# Patient Record
Sex: MALE | Race: WHITE | NOT HISPANIC OR LATINO | Employment: UNEMPLOYED | URBAN - METROPOLITAN AREA
[De-identification: names, ages, dates, MRNs, and addresses within clinical notes are randomized per-mention and may not be internally consistent; named-entity substitution may affect disease eponyms.]

---

## 2018-08-23 ENCOUNTER — HOSPITAL ENCOUNTER (EMERGENCY)
Facility: HOSPITAL | Age: 33
Discharge: HOME | End: 2018-08-24
Attending: EMERGENCY MEDICINE

## 2018-08-23 VITALS
WEIGHT: 220 LBS | SYSTOLIC BLOOD PRESSURE: 124 MMHG | HEIGHT: 75 IN | DIASTOLIC BLOOD PRESSURE: 83 MMHG | RESPIRATION RATE: 20 BRPM | BODY MASS INDEX: 27.35 KG/M2 | TEMPERATURE: 98 F | OXYGEN SATURATION: 95 % | HEART RATE: 99 BPM

## 2018-08-23 DIAGNOSIS — H10.9 CONJUNCTIVITIS OF BOTH EYES, UNSPECIFIED CONJUNCTIVITIS TYPE: Primary | ICD-10-CM

## 2018-08-23 PROCEDURE — 99283 EMERGENCY DEPT VISIT LOW MDM: CPT

## 2018-08-24 PROCEDURE — 25000003 PHARM REV CODE 250: Performed by: EMERGENCY MEDICINE

## 2018-08-24 RX ORDER — GENTAMICIN SULFATE 3 MG/ML
2 SOLUTION/ DROPS OPHTHALMIC
Status: COMPLETED | OUTPATIENT
Start: 2018-08-24 | End: 2018-08-24

## 2018-08-24 RX ADMIN — GENTAMICIN SULFATE 2 DROP: 3 SOLUTION OPHTHALMIC at 12:08

## 2018-08-24 NOTE — ED PROVIDER NOTES
Encounter Date: 8/23/2018       History     Chief Complaint   Patient presents with    Eye Pain     33-year-old male with acute left eye pain, swelling and discharge from the lid     Some milder symptoms in the right     No direct trauma  Denies any work about flying debris             Review of patient's allergies indicates:  No Known Allergies  History reviewed. No pertinent past medical history.  History reviewed. No pertinent surgical history.  No family history on file.  Social History     Tobacco Use    Smoking status: Current Every Day Smoker     Packs/day: 1.00     Types: Cigarettes   Substance Use Topics    Alcohol use: Yes     Comment: rarely    Drug use: Not on file     Review of Systems   Constitutional: Negative.    HENT: Negative for congestion, ear discharge, ear pain, facial swelling, postnasal drip, rhinorrhea, sinus pressure, sinus pain, sneezing, sore throat and trouble swallowing.    Eyes: Positive for pain, discharge and redness. Negative for photophobia, itching and visual disturbance.   Respiratory: Negative.    Cardiovascular: Negative.    Genitourinary: Negative for discharge.   Skin: Negative.    Neurological: Negative for headaches.   Hematological: Negative for adenopathy.   All other systems reviewed and are negative.      Physical Exam     Initial Vitals [08/23/18 2303]   BP Pulse Resp Temp SpO2   124/83 99 20 98.4 °F (36.9 °C) 95 %      MAP       --         Physical Exam    Nursing note and vitals reviewed.  Constitutional: He appears well-developed and well-nourished.   Eyes: EOM are normal. Pupils are equal, round, and reactive to light. Right eye exhibits discharge and exudate. Right eye exhibits no hordeolum. No foreign body present in the right eye. Left eye exhibits chemosis, discharge and exudate. Left eye exhibits no hordeolum. No foreign body present in the left eye. Right conjunctiva is injected. Right conjunctiva has no hemorrhage. Left conjunctiva is injected. Left  conjunctiva has no hemorrhage. No scleral icterus.         ED Course   Procedures  Labs Reviewed - No data to display       Imaging Results    None          Medical Decision Making:   Initial Assessment:   Acute conjunctivitis  No suggested foreign body  No suggested abrasion    Treatment initiated in the ER with gentamicin ophthalmic drops - and will continue for 5 days total                      Clinical Impression:   The encounter diagnosis was Conjunctivitis of both eyes, unspecified conjunctivitis type.                             Jose Elizalde MD  08/24/18 0457

## 2019-09-03 ENCOUNTER — HOSPITAL ENCOUNTER (EMERGENCY)
Facility: HOSPITAL | Age: 34
Discharge: LEFT BEFORE DISCHARGE | End: 2019-09-03

## 2019-09-03 VITALS
HEIGHT: 75 IN | HEART RATE: 102 BPM | BODY MASS INDEX: 28.6 KG/M2 | DIASTOLIC BLOOD PRESSURE: 81 MMHG | WEIGHT: 230 LBS | TEMPERATURE: 98 F | SYSTOLIC BLOOD PRESSURE: 129 MMHG | OXYGEN SATURATION: 97 % | RESPIRATION RATE: 16 BRPM

## 2019-09-03 DIAGNOSIS — T14.90XA INJURY: ICD-10-CM

## 2019-09-03 DIAGNOSIS — S91.312A LACERATION OF LEFT FOOT, INITIAL ENCOUNTER: Primary | ICD-10-CM

## 2019-09-03 PROCEDURE — 99283 EMERGENCY DEPT VISIT LOW MDM: CPT | Mod: 25

## 2019-09-03 PROCEDURE — 73630 X-RAY EXAM OF FOOT: CPT | Mod: 26,LT,, | Performed by: RADIOLOGY

## 2019-09-03 PROCEDURE — 73630 XR FOOT COMPLETE 3 VIEW LEFT: ICD-10-PCS | Mod: 26,LT,, | Performed by: RADIOLOGY

## 2019-09-03 PROCEDURE — 73630 X-RAY EXAM OF FOOT: CPT | Mod: TC,FY,LT

## 2019-09-03 RX ORDER — LIDOCAINE HYDROCHLORIDE 10 MG/ML
10 INJECTION INFILTRATION; PERINEURAL
Status: DISCONTINUED | OUTPATIENT
Start: 2019-09-03 | End: 2019-09-03 | Stop reason: HOSPADM

## 2019-09-03 NOTE — LETTER
Patient: Tobi Landeros  YOB: 1985  Date: 9/3/2019 Time: 7:29 PM  Location: [facility] - ED    Leaving the Hospital Against Medical Advice    Chart #:[id number]    This will certify that I, the undersigned,    ______________________________________________________________________    A patient in the above named medical center, having requested discharge and removal from the medical [place] against the advice of my attending physician(s), hereby release [facility], its physicians, officers and employees, severally and individually, from any and all liability of any nature whatsoever for any injury or harm or complication of any kind that may result directly or indirectly, by reason of my terminating my stay as a patient at [facility] - ED and my departure from [facility], and hereby waive any and all rights of action I may now have or later acquire as a result of my voluntary departure from [facility] and the termination of my stay as a patient therein.    This release is made with the full knowledge of the danger that may result from the action which I am taking.      Date:_______________________                         ___________________________                                                                                    Patient/Legal Representative    Witness:        ____________________________                          ___________________________  Nurse                                                                        Physician

## 2019-09-04 NOTE — ED PROVIDER NOTES
Encounter Date: 9/3/2019       History     Chief Complaint   Patient presents with    laceration on bottom of foot     left foot     Patient presents to ER with complaint of laceration to left bottom foot.  Patient states stepped on a light bulb and when it broke and lacerated his foot.  Patient denies any pain at this time stated this happened a few hours prior to arrival today states bleeding is controlled with bandage stated that was applied by apartment complex.  Patient states has had tetanus within the last 5 years denies any other associated symptoms.    The history is provided by the patient.     Review of patient's allergies indicates:  No Known Allergies  History reviewed. No pertinent past medical history.  History reviewed. No pertinent surgical history.  History reviewed. No pertinent family history.  Social History     Tobacco Use    Smoking status: Current Every Day Smoker     Packs/day: 1.00     Types: Cigarettes   Substance Use Topics    Alcohol use: Yes     Comment: rarely    Drug use: Not on file     Review of Systems   Constitutional: Negative for fever.   HENT: Negative for sore throat.    Respiratory: Negative for shortness of breath.    Cardiovascular: Negative for chest pain.   Gastrointestinal: Negative for nausea.   Genitourinary: Negative for dysuria.   Musculoskeletal: Negative for back pain.   Skin: Positive for wound (Left bottom foot). Negative for rash.   Neurological: Negative for weakness.   Hematological: Does not bruise/bleed easily.   All other systems reviewed and are negative.      Physical Exam     Initial Vitals [09/03/19 1819]   BP Pulse Resp Temp SpO2   129/81 102 16 98.4 °F (36.9 °C) 97 %      MAP       --         Physical Exam    Nursing note and vitals reviewed.  Constitutional: He appears well-developed and well-nourished. He is not diaphoretic. No distress.   Patient is very fidgety when entering the room stating he is very afraid of needles   HENT:   Head:  Atraumatic.   Eyes: Right eye exhibits no discharge. Left eye exhibits no discharge.   Neck: Normal range of motion.   Cardiovascular: Normal rate, regular rhythm, normal heart sounds and intact distal pulses. Exam reveals no gallop and no friction rub.    No murmur heard.  Pulmonary/Chest: Breath sounds normal. No respiratory distress. He has no wheezes. He has no rhonchi. He has no rales. He exhibits no tenderness.   Musculoskeletal: Normal range of motion. He exhibits tenderness. He exhibits no edema.        Right foot: Normal.        Left foot: There is tenderness and laceration. There is normal range of motion, no bony tenderness, no swelling, normal capillary refill, no crepitus and no deformity.        Feet:    Neurological: He is alert and oriented to person, place, and time. GCS score is 15. GCS eye subscore is 4. GCS verbal subscore is 5. GCS motor subscore is 6.   Skin: Skin is warm and dry. Capillary refill takes less than 2 seconds.   Psychiatric: He has a normal mood and affect. Thought content normal.         ED Course   Procedures  Labs Reviewed - No data to display       Imaging Results          X-Ray Foot Complete Left (In process)               X-Rays:   Independently Interpreted Readings:   Other Readings:  No foreign body noted on x-ray    Medical Decision Making:   Differential Diagnosis:   Laceration foreign body  Clinical Tests:   Radiological Study: Ordered and Reviewed  ED Management:  Discussed with patient plan of care of needing sutures and order an x-ray to look for possible foreign bodies patient states understood did not have any questions.    When I entered the room to do the numbing of the laceration and suture repair patient stated he no longer wants to have his foot sutured stated would like to leave discussed with the patient risk of infection and infection leading to possible amputation or loss of limb as well as could include death due to infection patient stated they  understood and still wanted to leave against medical advice.  Discussed with the patient he could return at any time to have his foot re-evaluated patient states understood did not have any questions.                      Clinical Impression:       ICD-10-CM ICD-9-CM   1. Laceration of left foot, initial encounter S91.312A 892.0   2. Injury T14.90XA 959.9                                GUANACO Frias  09/03/19 1935

## 2019-09-04 NOTE — DISCHARGE INSTRUCTIONS
Your blood pressure was elevated on exam today please follow up with pcp for blood pressure management.

## 2024-04-25 ENCOUNTER — HOSPITAL ENCOUNTER (EMERGENCY)
Facility: HOSPITAL | Age: 39
Discharge: HOME | End: 2024-04-25
Attending: EMERGENCY MEDICINE

## 2024-04-25 VITALS
WEIGHT: 191.38 LBS | BODY MASS INDEX: 23.8 KG/M2 | HEART RATE: 99 BPM | TEMPERATURE: 98 F | OXYGEN SATURATION: 100 % | SYSTOLIC BLOOD PRESSURE: 133 MMHG | RESPIRATION RATE: 15 BRPM | HEIGHT: 75 IN | DIASTOLIC BLOOD PRESSURE: 77 MMHG

## 2024-04-25 DIAGNOSIS — Z71.1 CONCERN ABOUT STD IN MALE WITHOUT DIAGNOSIS: Primary | ICD-10-CM

## 2024-04-25 LAB
BACTERIA #/AREA URNS HPF: ABNORMAL /HPF
BILIRUB UR QL STRIP: NEGATIVE
CLARITY UR: ABNORMAL
COLOR UR: YELLOW
GLUCOSE UR QL STRIP: NEGATIVE
HGB UR QL STRIP: ABNORMAL
HYALINE CASTS #/AREA URNS LPF: 0 /LPF
KETONES UR QL STRIP: NEGATIVE
LEUKOCYTE ESTERASE UR QL STRIP: ABNORMAL
MICROSCOPIC COMMENT: ABNORMAL
NITRITE UR QL STRIP: NEGATIVE
PH UR STRIP: 7 [PH] (ref 5–8)
PROT UR QL STRIP: ABNORMAL
RBC #/AREA URNS HPF: 6 /HPF (ref 0–4)
SP GR UR STRIP: 1.02 (ref 1–1.03)
URN SPEC COLLECT METH UR: ABNORMAL
UROBILINOGEN UR STRIP-ACNC: ABNORMAL EU/DL
WBC #/AREA URNS HPF: >100 /HPF (ref 0–5)

## 2024-04-25 PROCEDURE — 87591 N.GONORRHOEAE DNA AMP PROB: CPT | Performed by: NURSE PRACTITIONER

## 2024-04-25 PROCEDURE — 87086 URINE CULTURE/COLONY COUNT: CPT | Performed by: NURSE PRACTITIONER

## 2024-04-25 PROCEDURE — 99284 EMERGENCY DEPT VISIT MOD MDM: CPT | Mod: 25

## 2024-04-25 PROCEDURE — 81000 URINALYSIS NONAUTO W/SCOPE: CPT | Performed by: NURSE PRACTITIONER

## 2024-04-25 PROCEDURE — 87491 CHLMYD TRACH DNA AMP PROBE: CPT | Performed by: NURSE PRACTITIONER

## 2024-04-25 PROCEDURE — 63600175 PHARM REV CODE 636 W HCPCS: Performed by: NURSE PRACTITIONER

## 2024-04-25 PROCEDURE — 96372 THER/PROPH/DIAG INJ SC/IM: CPT | Performed by: NURSE PRACTITIONER

## 2024-04-25 PROCEDURE — 25000003 PHARM REV CODE 250: Performed by: NURSE PRACTITIONER

## 2024-04-25 RX ORDER — DOXYCYCLINE 100 MG/1
100 CAPSULE ORAL 2 TIMES DAILY
Qty: 20 CAPSULE | Refills: 0 | Status: SHIPPED | OUTPATIENT
Start: 2024-04-25 | End: 2024-05-05

## 2024-04-25 RX ORDER — DOXYCYCLINE HYCLATE 100 MG
100 TABLET ORAL
Status: COMPLETED | OUTPATIENT
Start: 2024-04-25 | End: 2024-04-25

## 2024-04-25 RX ORDER — CEFTRIAXONE 1 G/1
1 INJECTION, POWDER, FOR SOLUTION INTRAMUSCULAR; INTRAVENOUS
Status: COMPLETED | OUTPATIENT
Start: 2024-04-25 | End: 2024-04-25

## 2024-04-25 RX ADMIN — DOXYCYCLINE HYCLATE 100 MG: 100 TABLET, COATED ORAL at 07:04

## 2024-04-25 RX ADMIN — CEFTRIAXONE 1 G: 1 INJECTION, POWDER, FOR SOLUTION INTRAMUSCULAR; INTRAVENOUS at 07:04

## 2024-04-26 NOTE — ED PROVIDER NOTES
Encounter Date: 4/25/2024       History     Chief Complaint   Patient presents with    Penile Discharge     C/o penile discharge and burning urination that both started yesterday. Denies n/v/d. Last sexual partner was two weeks ago.      POV to ED alone.  Patient complains of drainage from his penis for 2-3 days.  Reports burning with urination.  No abdominal pain.  No lesions on the penis.  No scrotal tenderness or pain.  Reports having unprotected sex.  No other complaints    The history is provided by the patient.     Review of patient's allergies indicates:  No Known Allergies  No past medical history on file.  No past surgical history on file.  No family history on file.  Social History     Tobacco Use    Smoking status: Every Day     Current packs/day: 1.00     Types: Cigarettes   Substance Use Topics    Alcohol use: Yes     Comment: rarely     Review of Systems   Constitutional:  Negative for fever.   Gastrointestinal:  Negative for abdominal pain, diarrhea, nausea and vomiting.   Genitourinary:  Positive for dysuria and penile discharge. Negative for flank pain, penile pain, penile swelling, scrotal swelling and testicular pain.   All other systems reviewed and are negative.      Physical Exam     Initial Vitals   BP Pulse Resp Temp SpO2   04/25/24 1803 04/25/24 1803 04/25/24 1803 04/25/24 1946 04/25/24 1803   133/77 99 15 98.1 °F (36.7 °C) 100 %      MAP       --                Physical Exam    Nursing note and vitals reviewed.  Constitutional: He appears well-developed and well-nourished. No distress.   HENT:   Head: Normocephalic and atraumatic.   Mouth/Throat: Oropharynx is clear and moist.   Eyes: Pupils are equal, round, and reactive to light.   Neck:   Normal range of motion.  Cardiovascular:  Normal rate and regular rhythm.           Pulmonary/Chest: Breath sounds normal. No respiratory distress.   Abdominal: Abdomen is soft. Bowel sounds are normal. There is no abdominal tenderness.   Genitourinary:     Genitourinary Comments: Exam deferred     Musculoskeletal:         General: Normal range of motion.      Cervical back: Normal range of motion.     Neurological: He is alert and oriented to person, place, and time. He has normal strength. GCS score is 15. GCS eye subscore is 4. GCS verbal subscore is 5. GCS motor subscore is 6.   Skin: Skin is warm and dry. Capillary refill takes 2 to 3 seconds.   Psychiatric: He has a normal mood and affect. Thought content normal.         ED Course   Procedures  Labs Reviewed   URINALYSIS, REFLEX TO URINE CULTURE - Abnormal; Notable for the following components:       Result Value    Appearance, UA Hazy (*)     Protein, UA 1+ (*)     Occult Blood UA Trace (*)     Urobilinogen, UA 2.0-3.0 (*)     Leukocytes, UA 1+ (*)     All other components within normal limits    Narrative:     Preferred Collection Type->Urine, Clean Catch  Specimen Source->Urine   URINALYSIS MICROSCOPIC - Abnormal; Notable for the following components:    RBC, UA 6 (*)     WBC, UA >100 (*)     Bacteria Moderate (*)     All other components within normal limits    Narrative:     Preferred Collection Type->Urine, Clean Catch  Specimen Source->Urine   CULTURE, URINE   C. TRACHOMATIS/N. GONORRHOEAE BY AMP DNA          Imaging Results    None          Medications   cefTRIAXone injection 1 g (1 g Intramuscular Given 4/25/24 1919)   doxycycline tablet 100 mg (100 mg Oral Given 4/25/24 1919)     Medical Decision Making  Presents for evaluation of drainage from the penis.  Lab work ordered.  Disposition pending  Differentials including but not limited to STD, UTI, viral illness  Discharged home, diagnosis STD prophylaxis.  Exposure.  Treatment for chlamydia and gonorrhea.  Urine culture is pending.  Prescriptions for home use.  To follow up with clinic for complete STD workup, he agrees with plan of care.  Condom use at all times    Amount and/or Complexity of Data Reviewed  Labs: ordered. Decision-making details  documented in ED Course.    Risk  OTC drugs.  Prescription drug management.               ED Course as of 04/25/24 2124   Thu Apr 25, 2024 2121    Urinalysis Microscopic    Final resultCollected 04/25 1919    RBC, UA 6  WBC, UA >100  Bacteria, UA Moderate   Urinalysis, Reflex to Urine Culture Urine, Clean Catch    Final resultCollected 04/25 1919    Appearance, UA Hazy  Protein, UA 1+  Blood, UA Trace  UROBILINOGEN UA 2.0-3.0  Leukocyte Esterase, UA 1+          [CP]      ED Course User Index  [CP] Aranza William NP                           Clinical Impression:  Final diagnoses:  [Z71.1] Concern about STD in male without diagnosis (Primary)          ED Disposition Condition    Discharge Stable          ED Prescriptions       Medication Sig Dispense Start Date End Date Auth. Provider    doxycycline (VIBRAMYCIN) 100 MG Cap Take 1 capsule (100 mg total) by mouth 2 (two) times daily. for 10 days 20 capsule 4/25/2024 5/5/2024 Aranza William NP          Follow-up Information       Follow up With Specialties Details Why Contact [facility]  Call in 3 days               Aranza William NP  04/25/24 2124

## 2024-04-26 NOTE — DISCHARGE INSTRUCTIONS
Rest, increase fluids, lots of water and liquids.  You were treated today prophylactically for gonorrhea and chlamydia.  Test results are pending.  Follow up with [facility] or [facility].  You need a complete full STD workup.  Condom use at all times.  Return as needed

## 2024-04-27 LAB — BACTERIA UR CULT: NO GROWTH

## 2024-04-29 LAB
C TRACH DNA SPEC QL NAA+PROBE: NOT DETECTED
N GONORRHOEA DNA SPEC QL NAA+PROBE: DETECTED